# Patient Record
Sex: FEMALE | Race: WHITE | NOT HISPANIC OR LATINO | Employment: PART TIME | URBAN - NONMETROPOLITAN AREA
[De-identification: names, ages, dates, MRNs, and addresses within clinical notes are randomized per-mention and may not be internally consistent; named-entity substitution may affect disease eponyms.]

---

## 2018-08-22 ENCOUNTER — HISTORICAL (OUTPATIENT)
Dept: ADMINISTRATIVE | Facility: HOSPITAL | Age: 58
End: 2018-08-22

## 2019-09-03 ENCOUNTER — HISTORICAL (OUTPATIENT)
Dept: ADMINISTRATIVE | Facility: HOSPITAL | Age: 59
End: 2019-09-03

## 2021-10-20 ENCOUNTER — HISTORICAL (OUTPATIENT)
Dept: ADMINISTRATIVE | Facility: HOSPITAL | Age: 61
End: 2021-10-20

## 2022-04-10 ENCOUNTER — HISTORICAL (OUTPATIENT)
Dept: ADMINISTRATIVE | Facility: HOSPITAL | Age: 62
End: 2022-04-10

## 2022-04-26 VITALS
BODY MASS INDEX: 27.35 KG/M2 | SYSTOLIC BLOOD PRESSURE: 118 MMHG | HEIGHT: 60 IN | WEIGHT: 139.31 LBS | DIASTOLIC BLOOD PRESSURE: 70 MMHG

## 2022-11-14 ENCOUNTER — HISTORICAL (OUTPATIENT)
Dept: ADMINISTRATIVE | Facility: HOSPITAL | Age: 62
End: 2022-11-14

## 2023-04-12 ENCOUNTER — TELEPHONE (OUTPATIENT)
Dept: OBSTETRICS AND GYNECOLOGY | Facility: CLINIC | Age: 63
End: 2023-04-12

## 2023-04-12 DIAGNOSIS — N95.2 VAGINAL ATROPHY: Primary | ICD-10-CM

## 2023-04-12 RX ORDER — ESTRADIOL 0.1 MG/G
CREAM VAGINAL
Qty: 42.5 G | Refills: 1 | Status: SHIPPED | OUTPATIENT
Start: 2023-04-12 | End: 2023-04-13 | Stop reason: CLARIF

## 2023-04-13 ENCOUNTER — TELEPHONE (OUTPATIENT)
Dept: OBSTETRICS AND GYNECOLOGY | Facility: CLINIC | Age: 63
End: 2023-04-13

## 2023-04-13 DIAGNOSIS — N95.1 MENOPAUSAL SYMPTOMS: Primary | ICD-10-CM

## 2023-04-13 RX ORDER — ESTRADIOL 0.5 MG/1
0.5 TABLET ORAL DAILY
Qty: 30 TABLET | Refills: 3 | Status: SHIPPED | OUTPATIENT
Start: 2023-04-13 | End: 2023-09-15 | Stop reason: SDUPTHER

## 2023-04-13 RX ORDER — CONJUGATED ESTROGENS 0.62 MG/G
CREAM VAGINAL
COMMUNITY
Start: 2022-11-14 | End: 2023-11-15

## 2023-04-13 NOTE — TELEPHONE ENCOUNTER
----- Message from Yancy Ann sent at 4/12/2023 11:15 AM CDT -----  Regarding: Call Back  Type:  Patient Returning Call    Who Called:Pt  Who Left Message for Patient:  Does the patient know what this is regarding?:  Would the patient rather a call back or a response via [facility]? Call Back  Best Call Back Number:[phone]  Additional Information: Pt said she switched to Medicare and is asking for a cheaper alternative for premarin.

## 2023-04-13 NOTE — TELEPHONE ENCOUNTER
Spoke with patient regarding which hormone is not being covered by insurance.  Patient states that her Premarin 0.3mg tablet is not being covered.  Spoke with SILVANO Livingston NP and  Estradiol 0.5mg ordered and sent to pharmacy.    
97.6

## 2023-08-28 ENCOUNTER — DOCUMENT SCAN (OUTPATIENT)
Dept: OBSTETRICS AND GYNECOLOGY | Facility: CLINIC | Age: 63
End: 2023-08-28

## 2023-09-15 DIAGNOSIS — N95.1 MENOPAUSAL SYMPTOMS: ICD-10-CM

## 2023-09-15 RX ORDER — ESTRADIOL 0.5 MG/1
0.5 TABLET ORAL DAILY
Qty: 30 TABLET | Refills: 3 | Status: SHIPPED | OUTPATIENT
Start: 2023-09-15 | End: 2024-01-13

## 2023-11-06 ENCOUNTER — TELEPHONE (OUTPATIENT)
Dept: OBSTETRICS AND GYNECOLOGY | Facility: CLINIC | Age: 63
End: 2023-11-06

## 2023-11-06 DIAGNOSIS — Z12.31 ENCOUNTER FOR SCREENING MAMMOGRAM FOR BREAST CANCER: Primary | ICD-10-CM

## 2023-11-06 NOTE — TELEPHONE ENCOUNTER
----- Message from Brylee Guillory sent at 11/6/2023  3:00 PM CST -----  Regarding: Mammogram Order  Contact: Olya  Type:  Mammogram    Caller is requesting to schedule their annual mammogram appointment.  Order is not listed in EPIC.  Please enter order and contact patient to schedule.  Name of Caller:Olya  Where would they like the mammogram performed?[facility]  Would the patient rather a call back or a response via [facility]? Call back  Best Call Back Number:[phone]   Additional Information: Pt called requesting to get her mammogram order sent over to the hospital before her appt.

## 2023-11-15 ENCOUNTER — OFFICE VISIT (OUTPATIENT)
Dept: OBSTETRICS AND GYNECOLOGY | Facility: CLINIC | Age: 63
End: 2023-11-15
Payer: MEDICARE

## 2023-11-15 VITALS
BODY MASS INDEX: 29.95 KG/M2 | WEIGHT: 152.56 LBS | SYSTOLIC BLOOD PRESSURE: 122 MMHG | TEMPERATURE: 98 F | DIASTOLIC BLOOD PRESSURE: 74 MMHG

## 2023-11-15 DIAGNOSIS — Z01.419 ROUTINE GYNECOLOGICAL EXAMINATION: Primary | ICD-10-CM

## 2023-11-15 DIAGNOSIS — Z79.890 HORMONE REPLACEMENT THERAPY (HRT): ICD-10-CM

## 2023-11-15 PROCEDURE — G0101 PR CA SCREEN;PELVIC/BREAST EXAM: ICD-10-PCS | Mod: ,,, | Performed by: NURSE PRACTITIONER

## 2023-11-15 PROCEDURE — G0101 CA SCREEN;PELVIC/BREAST EXAM: HCPCS | Mod: ,,, | Performed by: NURSE PRACTITIONER

## 2023-11-15 RX ORDER — DIAPER,BRIEF,ADULT, DISPOSABLE
500 EACH MISCELLANEOUS DAILY
COMMUNITY

## 2023-11-15 RX ORDER — DEXTROMETHORPHAN HYDROBROMIDE, GUAIFENESIN 5; 100 MG/5ML; MG/5ML
650 LIQUID ORAL EVERY 8 HOURS
COMMUNITY

## 2023-11-15 RX ORDER — MONTELUKAST SODIUM 10 MG/1
10 TABLET ORAL NIGHTLY
COMMUNITY

## 2023-11-15 RX ORDER — VITAMIN E 268 MG
400 CAPSULE ORAL DAILY
COMMUNITY

## 2023-11-15 RX ORDER — CYCLOSPORINE 0.5 MG/ML
1 EMULSION OPHTHALMIC 2 TIMES DAILY
COMMUNITY

## 2023-11-15 RX ORDER — PANTOPRAZOLE SODIUM 40 MG/1
40 TABLET, DELAYED RELEASE ORAL EVERY MORNING
COMMUNITY
Start: 2023-10-19

## 2023-11-15 RX ORDER — MULTIVITAMIN
1 TABLET ORAL DAILY
COMMUNITY

## 2023-11-15 RX ORDER — DICLOFENAC SODIUM 50 MG/1
50 TABLET, DELAYED RELEASE ORAL 2 TIMES DAILY PRN
COMMUNITY
Start: 2023-11-09

## 2023-11-15 RX ORDER — TIZANIDINE 4 MG/1
2 TABLET ORAL NIGHTLY PRN
COMMUNITY
Start: 2023-10-13

## 2023-11-15 RX ORDER — ROPINIROLE 0.25 MG/1
0.5 TABLET, FILM COATED ORAL NIGHTLY
COMMUNITY
Start: 2023-11-01

## 2023-11-15 RX ORDER — HYDROXYCHLOROQUINE SULFATE 200 MG/1
300 TABLET, FILM COATED ORAL
COMMUNITY
Start: 2023-10-19

## 2023-11-15 NOTE — PROGRESS NOTES
Chief Complaint: Annual exam    Chief Complaint   Patient presents with    Well Woman     Annual GYN exam s/p hyst       HPI:   63 y.o. F  presents for an annual gyn exam.  S/p ANDREA, BSO.  Currently on estradiol 0.5mg with no complaints.    Plans to retire       FmHx: mother with uterine cancer, negative for breast, ovarian, and colon cancers.     Labs / Significant Studies:  MENOPAUSAL:  Age at menarche: 9  Age at menopause: 32  Hysterectomy:  Yes ANDREA BSO   Last pap: pt do not remember when  H/o abnl pap: no  Postcoital Bleeding: No  Sexually Active: yes  Dyspareunia: Yes  H/o STI: No   HRT: Premarin vag cream, estradiol(Estrace) 0.5mg  MM22 benign  H/o ab    Family History   Problem Relation Age of Onset    Uterine cancer Mother     Breast cancer Neg Hx     Colon cancer Neg Hx     Ovarian cancer Neg Hx     Cervical cancer Neg Hx          Past Medical History:   Diagnosis Date    Allergies     Escamilla's esophagus     Basal cell carcinoma (BCC) of eyebrow     Basal cell carcinoma (BCC) of upper lip     Mitral valve regurgitation     Sjogren's syndrome      Past Surgical History:   Procedure Laterality Date    APPENDECTOMY      basal cell removed on upper lip      basal cells removed on eye brow      ESOPHAGOGASTRODUODENOSCOPY      dignosed with, escamilla's esophagus    ESOPHAGOGASTRODUODENOSCOPY  2018    Nuclear stress test  10/11/2021    TOTAL ABDOMINAL HYSTERECTOMY W/ BILATERAL SALPINGOOPHORECTOMY         Current Outpatient Medications:     acetaminophen (TYLENOL) 650 MG TbSR, Take 650 mg by mouth every 8 (eight) hours., Disp: , Rfl:     conjugated estrogens (PREMARIN) vaginal cream,  See Instructions, 0.5gms vaginally q hs x 14 then 2x/week, # 30 gm, 6 Refill(s), Pharmacy: [facility], 152, cm, Height/Length Dosing, 22 15:04:00 CST, 65.9, kg, Weight Dosing, 22 15:04:00 CST, Disp: , Rfl:     cycloSPORINE (RESTASIS) 0.05 % ophthalmic emulsion, 1 drop 2 (two)  times daily., Disp: , Rfl:     diclofenac (VOLTAREN) 50 MG EC tablet, Take 50 mg by mouth 2 (two) times daily as needed., Disp: , Rfl:     estradioL (ESTRACE) 0.5 MG tablet, Take 1 tablet (0.5 mg total) by mouth once daily., Disp: 30 tablet, Rfl: 3    hydroxychloroquine (PLAQUENIL) 200 mg tablet, Take 300 mg by mouth., Disp: , Rfl:     lysine (L-LYSINE) 500 mg Tab, Take 500 mg by mouth once daily., Disp: , Rfl:     montelukast (SINGULAIR) 10 mg tablet, Take 10 mg by mouth every evening., Disp: , Rfl:     multivitamin (THERAGRAN) per tablet, Take 1 tablet by mouth once daily., Disp: , Rfl:     pantoprazole (PROTONIX) 40 MG tablet, Take 40 mg by mouth every morning., Disp: , Rfl:     rOPINIRole (REQUIP) 0.25 MG tablet, Take 0.5 mg by mouth every evening., Disp: , Rfl:     tiZANidine (ZANAFLEX) 4 MG tablet, Take 2 mg by mouth nightly as needed., Disp: , Rfl:     vitamin E 400 UNIT capsule, Take 400 Units by mouth once daily., Disp: , Rfl:     Review of patient's allergies indicates:   Allergen Reactions    Sulfa (sulfonamide antibiotics)      Other reaction(s): Flushing, generalized swelling    Prednisone Palpitations     Other reaction(s): Dehydration       Social History     Tobacco Use    Smoking status: Never    Smokeless tobacco: Never   Substance Use Topics    Alcohol use: Not Currently    Drug use: Never       Review of Systems:  General/Constitutional: Chills denies. Fatigue/weakness denies. Fever denies. Night sweats denies. Hot flashes denies    Respiratory: Cough denies. Hemoptysis denies. SOB denies. Sputum production denies. Wheezing denies .   Cardiovascular: Chest pain denies . Dizziness denies. Palpitations denies. Swelling in hands/feet denies    Gastrointestinal: Abdominal pain denies. Blood in stool denies. Constipation denies. Diarrhea denies. Heartburn denies. Nausea denies. Vomiting denies    Genitourinary: Incontinence denies. Blood in urine denies. Frequent urination denies. Painful urination  denies. Urinary urgency denies. Nocturia denies    Gynecologic: Irregular menses denies. Heavy bleeding denies. Painful menses denies. Vaginal discharge denies. Vaginal odor denies. Vaginal itching denies. Vaginal lesion denies. Pelvic pain denies. Decreased libido denies. Vulvar lesion denies. Prolapse of genital organs denies. Painful intercourse denies. Postcoital bleeding denies    Psychiatric: Depression denies. Anxiety denies     Physical Exam:   Vitals:    11/15/23 0855   BP: 122/74   BP Location: Right arm   Patient Position: Sitting   BP Method: Medium (Manual)   Temp: 97.7 °F (36.5 °C)   TempSrc: Temporal   Weight: 69.2 kg (152 lb 8.9 oz)       Body mass index is 29.95 kg/m².       Chaperone: present.     General appearance: healthy, well-nourished and well-developed     Psychiatric: Orientation to time, place and person. Normal mood and affect and active, alert     Skin: Appearance: no rashes or lesions.     Neck:   Neck: supple, FROM, trachea midline. and no masses   Thyroid: no enlargement or nodules and non-tender.       Cardiovascular:   Auscultation: RRR and no murmur.   Peripheral Vascular: no varicosities, LLE edema, RLE edema, calf tenderness, and palpable cord and pedal pulses intact.     Lungs:   Respiratory effort: no intercostal retractions or accessory muscle usage.   Auscultation: no wheezing, rales/crackles, or rhonchi and clear to auscultation.     Breast:   Inspection/Palpation: no tenderness, discrete/distinct masses, skin changes, or abnormal secretions. Nipple appearance normal.     Abdomen:   Auscultation/Inspection/Palpation: no hepatomegaly, splenomegaly, masses, tenderness or CVA tenderness and soft, non-distended bowel sounds preset.    Hernia: no palpable hernias.     Female Genitalia:    Vulva: no masses, tenderness or lesions    Bladder/Urethra: no urethral discharge or mass, normal meatus, bladder non-distended.    Vagina: no tenderness, erythema, cystocele, rectocele,  abnormal vaginal discharge or vesicle(s) or ulcers       Lymph Nodes:   Palpation: non tender submandibular nodes, axillary nodes, or inguinal nodes.     Rectal Exam:   Rectum: normal perianal skin.       Assessment:     There is no problem list on file for this patient.      Health Maintenance Due   Topic Date Due    Hepatitis C Screening  Never done    Lipid Panel  Never done    HIV Screening  Never done    Hemoglobin A1c (Diabetic Prevention Screening)  Never done    DEXA Scan  Never done    TETANUS VACCINE  09/08/1998    Colorectal Cancer Screening  Never done    Shingles Vaccine (1 of 2) Never done    RSV Vaccine (Age 60+) (1 - 1-dose 60+ series) Never done    Pneumococcal Vaccines (Age 65+) (1 - PCV) Never done    Influenza Vaccine (1) 09/01/2023    COVID-19 Vaccine (3 - 2023-24 season) 09/01/2023    Mammogram  11/14/2023     The patient has no Health Maintenance topics of status Not Due      Plan:    Maxine was seen today for well woman.    Diagnoses and all orders for this visit:    Routine gynecological examination    Hormone replacement therapy (HRT)      Reviewed calcium needs, exercise, and prevention of osteoporosis.  Healthy diet  Annual MMG  Discussed breast self-awareness  Colonoscopy q 10 yrs  Reviewed normal menopause and menopausal symptoms  RTC 2 yrs or prn      D/c estradiol po  Continue premarin vaginal cream 2x/week

## 2023-11-17 ENCOUNTER — HOSPITAL ENCOUNTER (OUTPATIENT)
Dept: RADIOLOGY | Facility: HOSPITAL | Age: 63
Discharge: HOME | End: 2023-11-17
Attending: NURSE PRACTITIONER
Payer: MEDICARE

## 2023-11-17 DIAGNOSIS — Z12.31 ENCOUNTER FOR SCREENING MAMMOGRAM FOR BREAST CANCER: ICD-10-CM

## 2023-11-17 PROCEDURE — 77067 SCR MAMMO BI INCL CAD: CPT | Mod: TC

## 2024-02-26 ENCOUNTER — TELEPHONE (OUTPATIENT)
Dept: OBSTETRICS AND GYNECOLOGY | Facility: CLINIC | Age: 64
End: 2024-02-26
Payer: MEDICARE

## 2024-02-26 NOTE — TELEPHONE ENCOUNTER
Notified pt that per SL she needs to come in for an appt to check BP and to discuss risks of getting back on hormones before a decision is made to refill. Pt verbalized understanding. Pt states she will call back at a later time to schedule appt.

## 2024-02-26 NOTE — TELEPHONE ENCOUNTER
----- Message from Aaliyah Chen sent at 2/26/2024  9:45 AM CST -----  Regarding: Pt Message  Contact: Patient    [facility] - [place] [address]  [address]  [place] [address]  Phone: [phone] Fax: [phone]                         [place]  [facility] [place] - [place] - [address]  [address]  [place] [address]  Phone: [phone] Fax: [phone]    CALLER: patient  Telephone Information:  Mobile          [phone]    REASON FOR CALL: requesting to restart her hormone tablets due to hot flashes. Depending if the Premarin is cheaper at [facility] or [facility] is where she wants to send it.

## 2024-11-14 ENCOUNTER — TELEPHONE (OUTPATIENT)
Dept: OBSTETRICS AND GYNECOLOGY | Facility: CLINIC | Age: 64
End: 2024-11-14
Payer: MEDICARE

## 2024-11-14 DIAGNOSIS — Z12.31 BREAST CANCER SCREENING BY MAMMOGRAM: Primary | ICD-10-CM

## 2024-11-14 NOTE — TELEPHONE ENCOUNTER
----- Message from Brylee sent at 11/14/2024  4:05 PM CST -----  Regarding: Orders  Contact: Maxine  Type:  Mammogram    Caller is requesting to schedule their annual mammogram appointment.  Order is not listed in EPIC.  Please enter order and contact patient to schedule.  Name of Caller:Maxine  Where would they like the mammogram performed?[facility]  Would the patient rather a call back or a response via [facility]? Call back  Best Call Back Number:[phone]   Additional Information: Pt called requesting mammogram orders to be sent in

## 2024-11-25 ENCOUNTER — HOSPITAL ENCOUNTER (OUTPATIENT)
Dept: RADIOLOGY | Facility: HOSPITAL | Age: 64
Discharge: HOME | End: 2024-11-25
Attending: NURSE PRACTITIONER
Payer: MEDICARE

## 2024-11-25 DIAGNOSIS — Z12.31 BREAST CANCER SCREENING BY MAMMOGRAM: ICD-10-CM

## 2024-11-25 PROCEDURE — 77067 SCR MAMMO BI INCL CAD: CPT | Mod: TC
